# Patient Record
Sex: FEMALE | Race: WHITE | NOT HISPANIC OR LATINO | Employment: FULL TIME | URBAN - METROPOLITAN AREA
[De-identification: names, ages, dates, MRNs, and addresses within clinical notes are randomized per-mention and may not be internally consistent; named-entity substitution may affect disease eponyms.]

---
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
## 2020-09-25 ENCOUNTER — OFFICE VISIT (OUTPATIENT)
Dept: URGENT CARE | Facility: CLINIC | Age: 20
End: 2020-09-25
Payer: COMMERCIAL

## 2020-09-25 VITALS
SYSTOLIC BLOOD PRESSURE: 104 MMHG | RESPIRATION RATE: 18 BRPM | HEIGHT: 63 IN | TEMPERATURE: 98.7 F | OXYGEN SATURATION: 99 % | WEIGHT: 164 LBS | BODY MASS INDEX: 29.06 KG/M2 | DIASTOLIC BLOOD PRESSURE: 62 MMHG | HEART RATE: 79 BPM

## 2020-09-25 DIAGNOSIS — R07.81 RIB PAIN ON RIGHT SIDE: Primary | ICD-10-CM

## 2020-09-25 PROCEDURE — G0382 LEV 3 HOSP TYPE B ED VISIT: HCPCS | Performed by: PHYSICIAN ASSISTANT

## 2020-09-25 NOTE — PROGRESS NOTES
NAME: Azalea Andrade is a 21 y o  female  : 2000    MRN: [id number]      Assessment and Plan   Rib pain on right side [R07 81]  1  Rib pain on right side         Patient reports resolution of pain  Unable to reproduce on exam   No concerning associated symptoms  Discussed at the pain comes back to follow back up  Likely a muscle strain like patient stated as the area she points to is at anterior ribs 6 through 8     Anything changes or worsens go to the ER  She acknowledges    Patient Instructions   Patient Instructions   If pain returns follow back up  Anything changes or worsens go to the ER    Proceed to ER if symptoms worsen  Chief Complaint     Chief Complaint   Patient presents with    Abdominal Pain     right side pain at liver area 2 days  But not there now  History of Present Illness   Patient with no reported past medical history presents complaining of right-sided rib/abdominal pain x2 days  States 2 days ago she had the gradual onset of right-sided rib/abdominal pain  Not sure which one it is  States she thinks she might have pulled a muscle as she does a lot of heavy lifting but unsure  She reports that the pain has fully resolved as of earlier today  Currently denies any pain  Denies any associated symptoms such as nausea, vomiting, diarrhea, constipation, dysuria, hematuria, frequency, back pain, blood in stool or mucus in stool, chest pain, palpitations, shortness of breath or dyspnea  No pain on inspiration  She has not taken anything over-the-counter for her symptoms  States that when the pain was there it was pretty constant denies any radiation  States certain movements make it worse  Review of Systems   Review of Systems   Constitutional: Negative for chills, fatigue and fever  HENT: Negative for congestion  Respiratory: Negative for cough, chest tightness, shortness of breath, wheezing and stridor      Cardiovascular: Negative for chest pain and palpitations  Gastrointestinal: Negative for anal bleeding, blood in stool, constipation, diarrhea, nausea and vomiting  Right upper quadrant/right rib pain   Genitourinary: Negative for dysuria, flank pain, frequency, hematuria and urgency  Musculoskeletal: Negative for back pain, neck pain and neck stiffness  Skin: Negative for rash  Neurological: Negative for dizziness, light-headedness and headaches  Current Medications       Current Outpatient Medications:     LORazepam (ATIVAN) 0 5 mg tablet, Take 0 25 mg by mouth 2 (two) times a day, Disp: , Rfl:     Current Allergies     Allergies as of 09/25/2020 - Reviewed 09/25/2020   Allergen Reaction Noted    Buspar [buspirone]  01/18/2017    Clonazepam  01/18/2017              Past Medical History:   Diagnosis Date    Anxiety     Asthma     Depression     Psychiatric disorder     Self-injurious behavior        No past surgical history on file  No family history on file  Medications have been verified  The following portions of the patient's history were reviewed and updated as appropriate: allergies, current medications, past family history, past medical history, past social history, past surgical history and problem list     Objective   /62   Pulse 79   Temp 98 7 °F (37 1 °C)   Resp 18   Ht 5' 3" (1 6 m)   Wt 74 4 kg (164 lb)   SpO2 99%   BMI 29 05 kg/m²      Physical Exam     Physical Exam  Vitals signs and nursing note reviewed  Constitutional:       General: She is not in acute distress  Appearance: She is well-developed  She is not ill-appearing, toxic-appearing or diaphoretic  Cardiovascular:      Rate and Rhythm: Normal rate and regular rhythm  Heart sounds: Normal heart sounds  No murmur  Pulmonary:      Effort: Pulmonary effort is normal  No respiratory distress  Breath sounds: Normal breath sounds  No stridor  No wheezing, rhonchi or rales  Abdominal:      General: Abdomen is flat  Bowel sounds are normal  There is no distension  Tenderness: There is no abdominal tenderness  There is no right CVA tenderness, left CVA tenderness, guarding or rebound  Negative signs include Flores's sign, Rovsing's sign and McBurney's sign  Musculoskeletal:      Comments: Right ribs:  No deformities or step-offs  No tenderness to palpation anywhere in the ribs  Neurological:      Mental Status: She is alert

## 2022-05-08 ENCOUNTER — HOSPITAL ENCOUNTER (OUTPATIENT)
Facility: HOSPITAL | Age: 22
Setting detail: OBSERVATION
Discharge: HOME | End: 2022-05-09
Attending: EMERGENCY MEDICINE | Admitting: OBSTETRICS & GYNECOLOGY
Payer: COMMERCIAL

## 2022-05-08 DIAGNOSIS — N83.209 RUPTURED OVARIAN CYST: Primary | ICD-10-CM

## 2022-05-08 DIAGNOSIS — D64.9 ANEMIA: ICD-10-CM

## 2022-05-08 PROBLEM — D50.0 ANEMIA DUE TO BLOOD LOSS: Status: ACTIVE | Noted: 2022-05-08

## 2022-05-08 LAB
ABO GROUP BLD: NORMAL
ABO GROUP BLD: NORMAL
ALBUMIN SERPL BCP-MCNC: 3.7 G/DL (ref 3.5–5)
ALP SERPL-CCNC: 66 U/L (ref 46–116)
ALT SERPL W P-5'-P-CCNC: 12 U/L (ref 12–78)
ANION GAP SERPL CALCULATED.3IONS-SCNC: 8 MMOL/L (ref 4–13)
APTT PPP: 42 SECONDS (ref 23–37)
AST SERPL W P-5'-P-CCNC: 8 U/L (ref 5–45)
B-HCG SERPL-ACNC: <2 MIU/ML
BASOPHILS # BLD AUTO: 0.02 THOUSANDS/ΜL (ref 0–0.1)
BASOPHILS NFR BLD AUTO: 0 % (ref 0–1)
BILIRUB SERPL-MCNC: 0.4 MG/DL (ref 0.2–1)
BILIRUB UR QL STRIP: NEGATIVE
BLD GP AB SCN SERPL QL: NEGATIVE
BUN SERPL-MCNC: 8 MG/DL (ref 5–25)
CALCIUM SERPL-MCNC: 8.2 MG/DL (ref 8.3–10.1)
CHLORIDE SERPL-SCNC: 103 MMOL/L (ref 100–108)
CLARITY UR: CLEAR
CO2 SERPL-SCNC: 27 MMOL/L (ref 21–32)
COLOR UR: YELLOW
CREAT SERPL-MCNC: 0.78 MG/DL (ref 0.6–1.3)
EOSINOPHIL # BLD AUTO: 0.01 THOUSAND/ΜL (ref 0–0.61)
EOSINOPHIL NFR BLD AUTO: 0 % (ref 0–6)
ERYTHROCYTE [DISTWIDTH] IN BLOOD BY AUTOMATED COUNT: 12.7 % (ref 11.6–15.1)
EXT PREG TEST URINE: NEGATIVE
EXT. CONTROL ED NAV: NORMAL
GFR SERPL CREATININE-BSD FRML MDRD: 109 ML/MIN/1.73SQ M
GLUCOSE SERPL-MCNC: 84 MG/DL (ref 65–140)
GLUCOSE UR STRIP-MCNC: NEGATIVE MG/DL
HCT VFR BLD AUTO: 30.2 % (ref 34.8–46.1)
HCT VFR BLD AUTO: 30.6 % (ref 34.8–46.1)
HCT VFR BLD AUTO: 33.5 % (ref 34.8–46.1)
HGB BLD-MCNC: 10.1 G/DL (ref 11.5–15.4)
HGB BLD-MCNC: 10.7 G/DL (ref 11.5–15.4)
HGB BLD-MCNC: 9.7 G/DL (ref 11.5–15.4)
HGB UR QL STRIP.AUTO: NEGATIVE
IMM GRANULOCYTES # BLD AUTO: 0.01 THOUSAND/UL (ref 0–0.2)
IMM GRANULOCYTES NFR BLD AUTO: 0 % (ref 0–2)
INR PPP: 1.15 (ref 0.84–1.19)
KETONES UR STRIP-MCNC: NEGATIVE MG/DL
LEUKOCYTE ESTERASE UR QL STRIP: NEGATIVE
LYMPHOCYTES # BLD AUTO: 0.93 THOUSANDS/ΜL (ref 0.6–4.47)
LYMPHOCYTES NFR BLD AUTO: 20 % (ref 14–44)
MCH RBC QN AUTO: 29.6 PG (ref 26.8–34.3)
MCH RBC QN AUTO: 30 PG (ref 26.8–34.3)
MCH RBC QN AUTO: 30.4 PG (ref 26.8–34.3)
MCHC RBC AUTO-ENTMCNC: 31.9 G/DL (ref 31.4–37.4)
MCHC RBC AUTO-ENTMCNC: 32.1 G/DL (ref 31.4–37.4)
MCHC RBC AUTO-ENTMCNC: 33 G/DL (ref 31.4–37.4)
MCV RBC AUTO: 92 FL (ref 82–98)
MCV RBC AUTO: 93 FL (ref 82–98)
MCV RBC AUTO: 94 FL (ref 82–98)
MONOCYTES # BLD AUTO: 0.35 THOUSAND/ΜL (ref 0.17–1.22)
MONOCYTES NFR BLD AUTO: 7 % (ref 4–12)
NEUTROPHILS # BLD AUTO: 3.43 THOUSANDS/ΜL (ref 1.85–7.62)
NEUTS SEG NFR BLD AUTO: 73 % (ref 43–75)
NITRITE UR QL STRIP: NEGATIVE
NRBC BLD AUTO-RTO: 0 /100 WBCS
PH UR STRIP.AUTO: 6 [PH]
PLATELET # BLD AUTO: 166 THOUSANDS/UL (ref 149–390)
PLATELET # BLD AUTO: 172 THOUSANDS/UL (ref 149–390)
PLATELET # BLD AUTO: 187 THOUSANDS/UL (ref 149–390)
PMV BLD AUTO: 11.2 FL (ref 8.9–12.7)
PMV BLD AUTO: 11.3 FL (ref 8.9–12.7)
PMV BLD AUTO: 11.5 FL (ref 8.9–12.7)
POTASSIUM SERPL-SCNC: 3.5 MMOL/L (ref 3.5–5.3)
PROT SERPL-MCNC: 7.5 G/DL (ref 6.4–8.2)
PROT UR STRIP-MCNC: NEGATIVE MG/DL
PROTHROMBIN TIME: 14.6 SECONDS (ref 11.6–14.5)
RBC # BLD AUTO: 3.23 MILLION/UL (ref 3.81–5.12)
RBC # BLD AUTO: 3.32 MILLION/UL (ref 3.81–5.12)
RBC # BLD AUTO: 3.61 MILLION/UL (ref 3.81–5.12)
RH BLD: POSITIVE
RH BLD: POSITIVE
SODIUM SERPL-SCNC: 138 MMOL/L (ref 136–145)
SP GR UR STRIP.AUTO: 1.02 (ref 1–1.03)
SPECIMEN EXPIRATION DATE: NORMAL
UROBILINOGEN UR QL STRIP.AUTO: 0.2 E.U./DL
WBC # BLD AUTO: 4.42 THOUSAND/UL (ref 4.31–10.16)
WBC # BLD AUTO: 4.48 THOUSAND/UL (ref 4.31–10.16)
WBC # BLD AUTO: 4.75 THOUSAND/UL (ref 4.31–10.16)

## 2022-05-08 PROCEDURE — 86850 RBC ANTIBODY SCREEN: CPT | Performed by: PHYSICIAN ASSISTANT

## 2022-05-08 PROCEDURE — 81003 URINALYSIS AUTO W/O SCOPE: CPT | Performed by: PHYSICIAN ASSISTANT

## 2022-05-08 PROCEDURE — 85027 COMPLETE CBC AUTOMATED: CPT | Performed by: OBSTETRICS & GYNECOLOGY

## 2022-05-08 PROCEDURE — 96374 THER/PROPH/DIAG INJ IV PUSH: CPT

## 2022-05-08 PROCEDURE — 99285 EMERGENCY DEPT VISIT HI MDM: CPT

## 2022-05-08 PROCEDURE — 81025 URINE PREGNANCY TEST: CPT | Performed by: PHYSICIAN ASSISTANT

## 2022-05-08 PROCEDURE — 86900 BLOOD TYPING SEROLOGIC ABO: CPT | Performed by: PHYSICIAN ASSISTANT

## 2022-05-08 PROCEDURE — 96361 HYDRATE IV INFUSION ADD-ON: CPT

## 2022-05-08 PROCEDURE — 36415 COLL VENOUS BLD VENIPUNCTURE: CPT | Performed by: PHYSICIAN ASSISTANT

## 2022-05-08 PROCEDURE — 85025 COMPLETE CBC W/AUTO DIFF WBC: CPT | Performed by: PHYSICIAN ASSISTANT

## 2022-05-08 PROCEDURE — 99284 EMERGENCY DEPT VISIT MOD MDM: CPT | Performed by: PHYSICIAN ASSISTANT

## 2022-05-08 PROCEDURE — NC001 PR NO CHARGE: Performed by: OBSTETRICS & GYNECOLOGY

## 2022-05-08 PROCEDURE — 86901 BLOOD TYPING SEROLOGIC RH(D): CPT | Performed by: PHYSICIAN ASSISTANT

## 2022-05-08 PROCEDURE — 84702 CHORIONIC GONADOTROPIN TEST: CPT | Performed by: OBSTETRICS & GYNECOLOGY

## 2022-05-08 PROCEDURE — 80053 COMPREHEN METABOLIC PANEL: CPT | Performed by: PHYSICIAN ASSISTANT

## 2022-05-08 PROCEDURE — 85610 PROTHROMBIN TIME: CPT | Performed by: PHYSICIAN ASSISTANT

## 2022-05-08 PROCEDURE — 85730 THROMBOPLASTIN TIME PARTIAL: CPT | Performed by: PHYSICIAN ASSISTANT

## 2022-05-08 RX ORDER — KETOROLAC TROMETHAMINE 30 MG/ML
30 INJECTION, SOLUTION INTRAMUSCULAR; INTRAVENOUS ONCE
Status: COMPLETED | OUTPATIENT
Start: 2022-05-08 | End: 2022-05-08

## 2022-05-08 RX ORDER — CITALOPRAM 20 MG/1
20 TABLET ORAL DAILY
COMMUNITY
Start: 2022-04-29

## 2022-05-08 RX ORDER — KETOROLAC TROMETHAMINE 30 MG/ML
30 INJECTION, SOLUTION INTRAMUSCULAR; INTRAVENOUS EVERY 6 HOURS PRN
Status: DISCONTINUED | OUTPATIENT
Start: 2022-05-08 | End: 2022-05-09 | Stop reason: HOSPADM

## 2022-05-08 RX ORDER — SODIUM CHLORIDE, SODIUM LACTATE, POTASSIUM CHLORIDE, CALCIUM CHLORIDE 600; 310; 30; 20 MG/100ML; MG/100ML; MG/100ML; MG/100ML
125 INJECTION, SOLUTION INTRAVENOUS CONTINUOUS
Status: DISCONTINUED | OUTPATIENT
Start: 2022-05-08 | End: 2022-05-09

## 2022-05-08 RX ADMIN — KETOROLAC TROMETHAMINE 30 MG: 30 INJECTION, SOLUTION INTRAMUSCULAR; INTRAVENOUS at 20:55

## 2022-05-08 RX ADMIN — KETOROLAC TROMETHAMINE 30 MG: 30 INJECTION, SOLUTION INTRAMUSCULAR at 14:49

## 2022-05-08 RX ADMIN — SODIUM CHLORIDE, SODIUM LACTATE, POTASSIUM CHLORIDE, AND CALCIUM CHLORIDE 125 ML/HR: .6; .31; .03; .02 INJECTION, SOLUTION INTRAVENOUS at 17:06

## 2022-05-08 RX ADMIN — SODIUM CHLORIDE 1000 ML: 0.9 INJECTION, SOLUTION INTRAVENOUS at 14:42

## 2022-05-08 NOTE — LETTER
[facility]G [facility]  [address] [id number]  Dept: [phone]    May 9, 2022     Patient: Helen Manzanares   YOB: 2000   Date of Visit: 5/8/2022       To Whom it May Concern:    Helen Manzanares is under my professional care  She was seen in the hospital from 5/8/2022   to 05/09/22  She may return to work on May 17, 2022 without limitations  If you have any questions or concerns, please don't hesitate to call           Sincerely,          Jazmin Sol, DO

## 2022-05-08 NOTE — H&P
History and Physical  DOS: 5/8/2022                                                              Location:   ED7    Ana Robledo is a 24 y o  G0 who presents to ED with abdominal pain  3 days ago, she had severe pelvic and abd pain that started after intercourse, followed by syncopal episode  She presented at that time to [facility] ED where she had another syncopal episode and vomiting  She had CT and u/s which revealed ad and pelvic free fluid, most consistent with ruptured hemorrheagic ovarian cyst  She had neg pregnancy test  Hgb was 13 at that time  She was discharged  Since that time, pain is a little better, but still present and 5/10, worse with movement  She has been taking motrin/tylenol for pain  No nausea/ vomiting/ constipaiton or diarrhea  She does report decreased appetite LMP was 4/9, she reports she has regular menses and is due to get it tomorrow  Past Medical History:   Diagnosis Date    Anxiety     Asthma     Depression     Psychiatric disorder     Self-injurious behavior      History reviewed  No pertinent surgical history  Past OB/Gyn History:  Menstrual cycles every month  She has never seen an OBGYN  She is sexually active  She is not using contraception  Denies any history of sexually transmitted infection  Mild dysmenorrhea  History reviewed  No pertinent family history    Social History:  Social History     Socioeconomic History    Marital status: Single     Spouse name: Not on file    Number of children: Not on file    Years of education: Not on file    Highest education level: Not on file   Occupational History    Not on file   Tobacco Use    Smoking status: Current Every Day Smoker     Packs/day: 0 75     Types: Cigarettes    Smokeless tobacco: Never Used    Tobacco comment: "down to 2 cigs a day"   Vaping Use    Vaping Use: Not on file   Substance and Sexual Activity    Alcohol use: No    Drug use: No     Comment: states occassional "once a month" marijuana use    Sexual activity: Not on file   Other Topics Concern    Not on file   Social History Narrative    Not on file     Social Determinants of Health     Financial Resource Strain: Not on file   Food Insecurity: Not on file   Transportation Needs: Not on file   Physical Activity: Not on file   Stress: Not on file   Social Connections: Not on file   Intimate Partner Violence: Not on file   Housing Stability: Not on file     Allergies   Allergen Reactions    Buspar [Buspirone]     Clonazepam      No current facility-administered medications for this encounter  Current Outpatient Medications:     citalopram (CeleXA) 20 mg tablet, Take 20 mg by mouth daily, Disp: , Rfl:     LORazepam (ATIVAN) 0 5 mg tablet, Take 0 25 mg by mouth 2 (two) times a day (Patient not taking: Reported on 5/8/2022 ), Disp: , Rfl:     Review of Systems:  A complete review of systems was performed and was negative, except as listed  Ultrasound pelvis  Complex free fluid in the pelvis and particularly around the right ovary  Consider possible hemorrhage from ruptured ovarian cyst  CT abdomen pelvis with contrast  Free fluid in the upper abdomen  More complex mixed density fluid noted in the pelvis  Consider possible hemorrhage related to ruptured ovarian cyst        Physical Exam:  /56 (BP Location: Right arm)   Pulse 82   Resp 18   Ht 5' 3" (1 6 m)   Wt 74 4 kg (164 lb)   LMP 04/24/2022   SpO2 99%   BMI 29 05 kg/m²   GEN: The patient was alert and oriented x3, pleasant well-appearing female in no acute distress  ABD:  Soft, mildly tender to palpation in all four quadrants, nondistended, no rebound or guarding     EXT:  WWP, nontender, no edema    Lab Results   Component Value Date    WBC 4 75 05/08/2022    HGB 10 7 (L) 05/08/2022    HCT 33 5 (L) 05/08/2022    MCV 93 05/08/2022     05/08/2022     Lab Results   Component Value Date    SODIUM 138 05/08/2022    K 3 5 05/08/2022     05/08/2022    CO2 27 05/08/2022    AGAP 8 05/08/2022    BUN 8 05/08/2022    CREATININE 0 78 05/08/2022    GLUC 84 05/08/2022    CALCIUM 8 2 (L) 05/08/2022    AST 8 05/08/2022    ALT 12 05/08/2022    ALKPHOS 66 05/08/2022    TP 7 5 05/08/2022    TBILI 0 40 05/08/2022    EGFR 109 05/08/2022         Assessment & Plan: Rupesh Berrios is a 24 y o  G0 with ruptured hemorrhagic  ovarian cyst and drop in hgb from 13 to 10  Admit for obs  Serial labs, exams, may consider repeat imaging if worsening pain  Discussed possible need for surgery if concern for ongoing bleeding

## 2022-05-08 NOTE — ED PROVIDER NOTES
History  Chief Complaint   Patient presents with    Abdominal Pain     pt started with abd pain thursday and went to [place] ER had ultrasound perform and was told she had an ovarian cyst rupture, pt dc home  pt here for increase abd pain, lower back pain and dizziness     Patient is a 24 year white female with history of anxiety, asthma, depression who reports lower abdominal cramping beginning 3 days ago soon after having sexual intercourse with her boyfriend  She states she was then walking out of her residence when she had syncopal event in the hallway  Boyfriend brought her to [facility] ER where she had syncopal event in the waiting room  She was diagnosed based on CT scan and pelvic ultrasound with a suspected ruptured ovarian cyst,  likely hemorrhagic  She was discharged home with advice to take Tylenol or Motrin  She returns to this ER today stating continued abdominal pain and lower back pain  She has been trying to manage the pain with Tylenol and Motrin but does not feel she is improving  She denies fever or chills  She denies nausea or vomiting  Her last period was "the middle of last month"  She does not use birth control  She reports decreased appetite  She reports headache beginning 2 days ago           Prior to Admission Medications   Prescriptions Last Dose Informant Patient Reported? Taking? LORazepam (ATIVAN) 0 5 mg tablet Not Taking at Unknown time  Yes No   Sig: Take 0 25 mg by mouth 2 (two) times a day   Patient not taking: Reported on 5/8/2022    citalopram (CeleXA) 20 mg tablet 5/8/2022 at Unknown time  Yes Yes   Sig: Take 20 mg by mouth daily      Facility-Administered Medications: None       Past Medical History:   Diagnosis Date    Anxiety     Asthma     Depression     Psychiatric disorder     Self-injurious behavior        History reviewed  No pertinent surgical history  History reviewed  No pertinent family history    I have reviewed and agree with the history as documented  E-Cigarette/Vaping     E-Cigarette/Vaping Substances    Nicotine No     THC No     CBD No     Flavoring No     Other No     Unknown No      Social History     Tobacco Use    Smoking status: Current Every Day Smoker     Packs/day: 0 75     Types: Cigarettes    Smokeless tobacco: Never Used    Tobacco comment: "down to 2 cigs a day"   Vaping Use    Vaping Use: Not on file   Substance Use Topics    Alcohol use: No    Drug use: No     Comment: states occassional "once a month" marijuana use       Review of Systems   Constitutional: Negative for chills and fever  Respiratory: Negative for cough and shortness of breath  Cardiovascular: Negative for chest pain and palpitations  Gastrointestinal: Positive for abdominal pain  Negative for diarrhea, nausea and vomiting  Genitourinary: Negative for dysuria, hematuria, vaginal bleeding and vaginal discharge  Musculoskeletal: Positive for back pain  Negative for arthralgias and neck pain  Skin: Negative for rash  Neurological: Positive for syncope and headaches  All other systems reviewed and are negative  Physical Exam  Physical Exam  Vitals and nursing note reviewed  Constitutional:       General: She is not in acute distress  Appearance: She is well-developed  She is not ill-appearing, toxic-appearing or diaphoretic  HENT:      Head: Normocephalic and atraumatic  Mouth/Throat:      Mouth: Mucous membranes are moist       Pharynx: Oropharynx is clear  Eyes:      Extraocular Movements: Extraocular movements intact  Pupils: Pupils are equal, round, and reactive to light  Cardiovascular:      Rate and Rhythm: Normal rate and regular rhythm  Heart sounds: Normal heart sounds  Pulmonary:      Effort: Pulmonary effort is normal       Breath sounds: Normal breath sounds  Abdominal:      General: Abdomen is flat  Bowel sounds are normal       Palpations: Abdomen is soft  Tenderness:  There is no right CVA tenderness, left CVA tenderness, guarding or rebound  Comments: Diffuse abd tenderness, worse in lower abdomen R>L   Skin:     General: Skin is warm and dry  Capillary Refill: Capillary refill takes less than 2 seconds  Neurological:      Mental Status: She is alert           Vital Signs  ED Triage Vitals   Temp Pulse Respirations Blood Pressure SpO2   -- 05/08/22 1350 05/08/22 1350 05/08/22 1350 05/08/22 1350    80 18 126/60 98 %      Temp src Heart Rate Source Patient Position - Orthostatic VS BP Location FiO2 (%)   -- -- 05/08/22 1424 05/08/22 1424 --     Lying Right arm       Pain Score       05/08/22 1419       6           Vitals:    05/08/22 1350 05/08/22 1424   BP: 126/60 114/56   Pulse: 80 82   Patient Position - Orthostatic VS:  Lying         Visual Acuity      ED Medications  Medications   sodium chloride 0 9 % bolus 1,000 mL (1,000 mL Intravenous New Bag 5/8/22 1442)   ketorolac (TORADOL) injection 30 mg (30 mg Intravenous Given 5/8/22 1449)       Diagnostic Studies  Results Reviewed     Procedure Component Value Units Date/Time    Protime-INR [[id number]]     Lab Status: No result Specimen: Blood     APTT [[id number]]     Lab Status: No result Specimen: Blood     Comprehensive metabolic panel [[id number]]  (Abnormal) Collected: 05/08/22 1439    Lab Status: Final result Specimen: Blood from Arm, Left Updated: 05/08/22 1512     Sodium 138 mmol/L      Potassium 3 5 mmol/L      Chloride 103 mmol/L      CO2 27 mmol/L      ANION GAP 8 mmol/L      BUN 8 mg/dL      Creatinine 0 78 mg/dL      Glucose 84 mg/dL      Calcium 8 2 mg/dL      AST 8 U/L      ALT 12 U/L      Alkaline Phosphatase 66 U/L      Total Protein 7 5 g/dL      Albumin 3 7 g/dL      Total Bilirubin 0 40 mg/dL      eGFR 109 ml/min/1 73sq m     Narrative:      [place] guidelines for Chronic Kidney Disease (CKD):     Stage 1 with normal or high GFR (GFR > 90 mL/min/1 73 square meters)    Stage 2 Mild CKD (GFR = 60-89 mL/min/1 73 square meters)    Stage 3A Moderate CKD (GFR = 45-59 mL/min/1 73 square meters)    Stage 3B Moderate CKD (GFR = 30-44 mL/min/1 73 square meters)    Stage 4 Severe CKD (GFR = 15-29 mL/min/1 73 square meters)    Stage 5 End Stage CKD (GFR <15 mL/min/1 73 square meters)  Note: GFR calculation is accurate only with a steady state creatinine    UA w Reflex to Microscopic w Reflex to Culture [[id number]] Collected: 05/08/22 1449    Lab Status: Final result Specimen: Urine, Clean Catch Updated: 05/08/22 1511     Color, UA Yellow     Clarity, UA Clear     Specific [place], UA 1 020     pH, UA 6 0     Leukocytes, UA Negative     Nitrite, UA Negative     Protein, UA Negative mg/dl      Glucose, UA Negative mg/dl      Ketones, UA Negative mg/dl      Urobilinogen, UA 0 2 E U /dl      Bilirubin, UA Negative     Blood, UA Negative    POCT pregnancy, urine [[id number]]  (Normal) Resulted: 05/08/22 1448    Lab Status: Final result Updated: 05/08/22 1448     EXT PREG TEST UR (Ref: Negative) Negative     Control Valid    CBC and differential [[id number]]  (Abnormal) Collected: 05/08/22 1439    Lab Status: Final result Specimen: Blood from Arm, Left Updated: 05/08/22 1445     WBC 4 75 Thousand/uL      RBC 3 61 Million/uL      Hemoglobin 10 7 g/dL      Hematocrit 33 5 %      MCV 93 fL      MCH 29 6 pg      MCHC 31 9 g/dL      RDW 12 7 %      MPV 11 2 fL      Platelets 899 Thousands/uL      nRBC 0 /100 WBCs      Neutrophils Relative 73 %      Immat GRANS % 0 %      Lymphocytes Relative 20 %      Monocytes Relative 7 %      Eosinophils Relative 0 %      Basophils Relative 0 %      Neutrophils Absolute 3 43 Thousands/µL      Immature Grans Absolute 0 01 Thousand/uL      Lymphocytes Absolute 0 93 Thousands/µL      Monocytes Absolute 0 35 Thousand/µL      Eosinophils Absolute 0 01 Thousand/µL      Basophils Absolute 0 02 Thousands/µL                  No orders to display              Procedures  Procedures         ED Course  ED Course as of 05/08/22 1528   Sun May 08, 2022   1504 Hgb: 10 7 today  Was 13 3 at [place] 3 days ago  [place] text to obgyn Dr Demetria Severance  1521 Given hemoglobin dropped from 13 3 days ago to 10 7 today I discussed with OBGYN Dr Demetria Severance  my concern and will admit patient to observation for serial exams and serial blood work                                             MDM  Number of Diagnoses or Management Options  Anemia: new and requires workup  Ruptured ovarian cyst: new and requires workup  Diagnosis management comments: 49-year-old white female with onset 3 days of lower abdominal cramping after sexual intercourse  Diagnosed with likely ruptured hemorrhagic ovarian cyst at [place] ED  Hemoglobin dropped from 13 3- to 10 7 today  Discussed with OBGYN and will admit for observation and serial exams and  blood work         Amount and/or Complexity of Data Reviewed  Clinical lab tests: reviewed  Tests in the medicine section of CPT®: reviewed  Decide to obtain previous medical records or to obtain history from someone other than the patient: yes  Review and summarize past medical records: yes  Discuss the patient with other providers: yes  Independent visualization of images, tracings, or specimens: yes    Risk of Complications, Morbidity, and/or Mortality  Presenting problems: high  Diagnostic procedures: low  Management options: moderate    Patient Progress  Patient progress: stable      Disposition  Final diagnoses:   Ruptured ovarian cyst   Anemia     Time reflects when diagnosis was documented in both MDM as applicable and the Disposition within this note     Time User Action Codes Description Comment    5/8/2022  3:16 PM Juani Mc Add [F10 [id number]] Ruptured ovarian cyst     5/8/2022  3:16 PM Juani Mc Add [D64 9] Anemia       ED Disposition     ED Disposition Condition Date/Time Comment    Admit Stable [place] May 8, 2022  3:16 PM Case was discussed with Dr Demetria Severance and the patient's admission status was agreed to be Admission Status: observation status to the service of Dr Kev Persuad   Follow-up Information    None         Patient's Medications   Discharge Prescriptions    No medications on file       No discharge procedures on file      PDMP Review     None          ED Provider  Electronically Signed by           Maria G Gaines PA-C  05/08/22 6683

## 2022-05-08 NOTE — PLAN OF CARE
Problem: PAIN - ADULT  Goal: Verbalizes/displays adequate comfort level or baseline comfort level  Description: Interventions:  - Encourage patient to monitor pain and request assistance  - Assess pain using appropriate pain scale  - Administer analgesics based on type and severity of pain and evaluate response  - Implement non-pharmacological measures as appropriate and evaluate response  - Consider cultural and social influences on pain and pain management  - Notify physician/advanced practitioner if interventions unsuccessful or patient reports new pain  Outcome: Progressing     Problem: INFECTION - ADULT  Goal: Absence or prevention of progression during hospitalization  Description: INTERVENTIONS:  - Assess and monitor for signs and symptoms of infection  - Monitor lab/diagnostic results  - Monitor all insertion sites, i e  indwelling lines, tubes, and drains  - Monitor endotracheal if appropriate and nasal secretions for changes in amount and color  - Mallory appropriate cooling/warming therapies per order  - Administer medications as ordered  - Instruct and encourage patient and family to use good hand hygiene technique  - Identify and instruct in appropriate isolation precautions for identified infection/condition  Outcome: Progressing     Problem: DISCHARGE PLANNING  Goal: Discharge to home or other facility with appropriate resources  Description: INTERVENTIONS:  - Identify barriers to discharge w/patient and caregiver  - Arrange for needed discharge resources and transportation as appropriate  - Identify discharge learning needs (meds, wound care, etc )  - Arrange for interpretive services to assist at discharge as needed  - Refer to Case Management Department for coordinating discharge planning if the patient needs post-hospital services based on physician/advanced practitioner order or complex needs related to functional status, cognitive ability, or social support system  Outcome: Progressing Problem: Knowledge Deficit  Goal: Patient/family/caregiver demonstrates understanding of disease process, treatment plan, medications, and discharge instructions  Description: Complete learning assessment and assess knowledge base    Interventions:  - Provide teaching at level of understanding  - Provide teaching via preferred learning methods  Outcome: Progressing

## 2022-05-09 VITALS
RESPIRATION RATE: 18 BRPM | BODY MASS INDEX: 29.06 KG/M2 | DIASTOLIC BLOOD PRESSURE: 66 MMHG | OXYGEN SATURATION: 99 % | WEIGHT: 164 LBS | HEIGHT: 63 IN | TEMPERATURE: 98.7 F | HEART RATE: 76 BPM | SYSTOLIC BLOOD PRESSURE: 109 MMHG

## 2022-05-09 LAB
ERYTHROCYTE [DISTWIDTH] IN BLOOD BY AUTOMATED COUNT: 12.9 % (ref 11.6–15.1)
ERYTHROCYTE [DISTWIDTH] IN BLOOD BY AUTOMATED COUNT: 12.9 % (ref 11.6–15.1)
HCT VFR BLD AUTO: 28.3 % (ref 34.8–46.1)
HCT VFR BLD AUTO: 29.9 % (ref 34.8–46.1)
HGB BLD-MCNC: 9.2 G/DL (ref 11.5–15.4)
HGB BLD-MCNC: 9.5 G/DL (ref 11.5–15.4)
MCH RBC QN AUTO: 29.6 PG (ref 26.8–34.3)
MCH RBC QN AUTO: 30.2 PG (ref 26.8–34.3)
MCHC RBC AUTO-ENTMCNC: 31.8 G/DL (ref 31.4–37.4)
MCHC RBC AUTO-ENTMCNC: 32.5 G/DL (ref 31.4–37.4)
MCV RBC AUTO: 93 FL (ref 82–98)
MCV RBC AUTO: 93 FL (ref 82–98)
PLATELET # BLD AUTO: 161 THOUSANDS/UL (ref 149–390)
PLATELET # BLD AUTO: 169 THOUSANDS/UL (ref 149–390)
PMV BLD AUTO: 11.3 FL (ref 8.9–12.7)
PMV BLD AUTO: 11.3 FL (ref 8.9–12.7)
RBC # BLD AUTO: 3.05 MILLION/UL (ref 3.81–5.12)
RBC # BLD AUTO: 3.21 MILLION/UL (ref 3.81–5.12)
WBC # BLD AUTO: 3.63 THOUSAND/UL (ref 4.31–10.16)
WBC # BLD AUTO: 4.84 THOUSAND/UL (ref 4.31–10.16)

## 2022-05-09 PROCEDURE — 85027 COMPLETE CBC AUTOMATED: CPT | Performed by: OBSTETRICS & GYNECOLOGY

## 2022-05-09 PROCEDURE — NC001 PR NO CHARGE: Performed by: OBSTETRICS & GYNECOLOGY

## 2022-05-09 RX ORDER — CITALOPRAM 20 MG/1
20 TABLET ORAL DAILY
Status: DISCONTINUED | OUTPATIENT
Start: 2022-05-09 | End: 2022-05-09 | Stop reason: HOSPADM

## 2022-05-09 RX ORDER — IBUPROFEN 200 MG
600 TABLET ORAL EVERY 6 HOURS PRN
Qty: 20 TABLET | Refills: 0 | Status: SHIPPED | OUTPATIENT
Start: 2022-05-09 | End: 2022-05-14

## 2022-05-09 RX ADMIN — CITALOPRAM HYDROBROMIDE 20 MG: 20 TABLET ORAL at 11:19

## 2022-05-09 NOTE — UTILIZATION REVIEW
Initial Clinical Review    Admission: Date/Time/Statement: 5/8/22 1517 Observation   Admission Orders (From admission, onward)     Ordered        05/08/22 1517  Place in Observation  Once                      Orders Placed This Encounter   Procedures    Place in Observation     Standing Status:   Standing     Number of Occurrences:   1     Order Specific Question:   Level of Care     Answer:   Med Surg [16]     ED Arrival Information     Expected Arrival Acuity    - 5/8/2022 12:35 Urgent         Means of arrival Escorted by Service Admission type    [facility] Member OB/GYN Urgent         Arrival complaint    abdominal and low back pain        Chief Complaint   Patient presents with    Abdominal Pain     pt started with abd pain thursday and went to [facility] had ultrasound perform and was told she had an ovarian cyst rupture, pt dc home  pt here for increase abd pain, lower back pain and dizziness       Initial Presentation: 24 y o  female from home to ED admitted to observation due to ruptured hemorrhagic ovarian cyst with acute blood loss anemia  Presented due to lower abdominal cramping starting about 3 days prior to arrival after having intercourse, seen at another ED after 2 syncopal events 5/5/22 and told had suspected ruptured ovarian cyst   Pain is not improved, appetite decreased and +  headache  CT and u/s done 5/5/22 showed ad and pelvic free fluid, most consistent with ruptured hemorrheagic ovarian cyst On exam diffuse abdominal tenderness, worse in lower abdomen R>L  H&H 10 7/33 5  With hgb of 13 3 on 5/5/22  In the ED given 1 liter IVF bolus, Toradol  Plan is trend H&H, pain control, IVF in progress   Possible surgical intervention if ongoing bleeding  5/9/22 Observation:  Patient has continued abdominal pain  Hungry, tolerated breakfast  On exam abdomen soft  H&H 9 2/28  3  Plan is dc IVF then check CBC  Make NPO in case needs surgical intervention         ED Triage Vitals Temperature Pulse Respirations Blood Pressure SpO2   05/08/22 1622 05/08/22 1350 05/08/22 1350 05/08/22 1350 05/08/22 1350   98 7 °F (37 1 °C) 80 18 126/60 98 %      Temp Source Heart Rate Source Patient Position - Orthostatic VS BP Location FiO2 (%)   05/08/22 2333 -- 05/08/22 1424 05/08/22 1424 --   Oral  Lying Right arm       Pain Score       05/08/22 1419       6          Wt Readings from Last 1 Encounters:   05/08/22 74 4 kg (164 lb)     Additional Vital Signs:   05/09/22 05:40:25 98 3 °F (36 8 °C) -- -- 99/50 66 -- -- --   05/08/22 23:33:55 98 5 °F (36 9 °C) -- -- 98/53 68 -- None (Room air) Lying   05/08/22 16:22:33 98 7 °F (37 1 °C) 76 18 98/50 66 99 % -- --   05/08/22 1558 -- 77 16 99/56 -- 100 % None (Room air) Lying   05/08/22 1424 -- 82 -- 114/56 -- 99 % None (Room air) Lying       Pertinent Labs/Diagnostic Test Results: no imaging   No orders to display       Results from last 7 days   Lab Units 05/08/22  2140 05/08/22  1758 05/08/22  1439   WBC Thousand/uL 4 48 4 42 4 75   HEMOGLOBIN g/dL 10 1* 9 7* 10 7*   HEMATOCRIT % 30 6* 30 2* 33 5*   PLATELETS Thousands/uL 172 166 187   NEUTROS ABS Thousands/µL  --   --  3 43     Results from last 7 days   Lab Units 05/08/22  1439   SODIUM mmol/L 138   POTASSIUM mmol/L 3 5   CHLORIDE mmol/L 103   CO2 mmol/L 27   ANION GAP mmol/L 8   BUN mg/dL 8   CREATININE mg/dL 0 78   EGFR ml/min/1 73sq m 109   CALCIUM mg/dL 8 2*     Results from last 7 days   Lab Units 05/08/22  1439   AST U/L 8   ALT U/L 12   ALK PHOS U/L 66   TOTAL PROTEIN g/dL 7 5   ALBUMIN g/dL 3 7   TOTAL BILIRUBIN mg/dL 0 40     Results from last 7 days   Lab Units 05/08/22  1439   GLUCOSE RANDOM mg/dL 84     Results from last 7 days   Lab Units 05/08/22  1601   PROTIME seconds 14 6*   INR  1 15   PTT seconds 42*     Results from last 7 days   Lab Units 05/08/22  1449   CLARITY UA  Clear   COLOR UA  Yellow   SPEC GRAV UA  1 020   PH UA  6 0   GLUCOSE UA mg/dl Negative   KETONES UA mg/dl Negative BLOOD UA  Negative   PROTEIN UA mg/dl Negative   NITRITE UA  Negative   BILIRUBIN UA  Negative   UROBILINOGEN UA E U /dl 0 2   LEUKOCYTES UA  Negative       ED Treatment:   Medication Administration from 05/08/2022 1235 to 05/08/2022 1614       Date/Time Order Dose Route Action Comments     05/08/2022 1442 sodium chloride 0 9 % bolus 1,000 mL 1,000 mL Intravenous New Bag      05/08/2022 1449 ketorolac (TORADOL) injection 30 mg 30 mg Intravenous Given         Past Medical History:   Diagnosis Date    Anxiety     Asthma     Depression     Psychiatric disorder     Self-injurious behavior      Present on Admission:   Anemia due to blood loss      Admitting Diagnosis: Abdominal pain [R10 9]  Anemia [D64 9]  Ruptured ovarian cyst [N83 209]  Age/Sex: 24 y o  female  Admission Orders:  Scheduled Medications:  nicotine, 1 patch, Transdermal, Daily    citalopram (CeleXA) tablet 20 mg  Dose: 20 mg  Freq: Daily Route: PO  Start: 05/09/22 1100 End: 05/11/22 0859    Continuous IV Infusions:  lactated ringers, 125 mL/hr, Intravenous, Continuous - dc 5/9/22 0929  PRN Meds:  ketorolac, 30 mg, Intravenous, Q6H PRN - used x 1 5/8/22           Network Utilization Review Department  ATTENTION: Please call with any questions or concerns to [phone] and carefully listen to the prompts so that you are directed to the right person  All voicemails are confidential   Chrystine Can all requests for admission clinical reviews, approved or denied determinations and any other requests to dedicated fax number below belonging to the campus where the patient is receiving treatment   List of dedicated fax numbers for the Facilities:  [address] DENIALS (Administrative/Medical Necessity) [phone]   [address] (Maternity/NICU/Pediatrics) 261 [facility],[address]  [address] [place] [address] [id number] [place] [address] [address] [place] [id number] [phone]

## 2022-05-09 NOTE — DISCHARGE SUMMARY
Discharge Summary - Brooke Boston 24 y o  female MRN: [id number]    Unit/Bed#: -01 Encounter: [id number]    Admission Date:   Admission Orders (From admission, onward)     Ordered        05/08/22 1517  Place in Observation  Once                        Admitting Diagnosis: Abdominal pain [R10 9]  Anemia [D64 9]  Ruptured ovarian cyst [N83 209]    HPI: ruptured cyst diagnosed several days ago, pt came here as pain was not resolved    Procedures Performed: No orders of the defined types were placed in this encounter  Summary of Hospital Course: pt was stable throughout admission, vitals stable, pain unchanged,       Significant Findings, Care, Treatment and Services Provided: serial CBC, observation    Complications: none    Discharge Diagnosis: ruptured ovarian cyst, anemia due bleeding, bleeding resolved, hemoglobin stable    Medical Problems             Resolved Problems  Date Reviewed: 5/8/2022    None                Condition at Discharge: stable         Discharge instructions/Information to patient and family:   See after visit summary for information provided to patient and family  Provisions for Follow-Up Care:  See after visit summary for information related to follow-up care and any pertinent home health orders  PCP: Stephanie Baldwin MD    Disposition: See After Visit Summary for discharge disposition information  Planned Readmission: No      Discharge Statement   I spent 20 minutes discharging the patient  This time was spent on the day of discharge  I had direct contact with the patient on the day of discharge  Additional documentation is required if more than 30 minutes were spent on discharge  Discharge Medications:  See after visit summary for reconciled discharge medications provided to patient and family

## 2022-05-09 NOTE — PLAN OF CARE
Problem: PAIN - ADULT  Goal: Verbalizes/displays adequate comfort level or baseline comfort level  Description: Interventions:  - Encourage patient to monitor pain and request assistance  - Assess pain using appropriate pain scale  - Administer analgesics based on type and severity of pain and evaluate response  - Implement non-pharmacological measures as appropriate and evaluate response  - Consider cultural and social influences on pain and pain management  - Notify physician/advanced practitioner if interventions unsuccessful or patient reports new pain  Outcome: Progressing     Problem: INFECTION - ADULT  Goal: Absence or prevention of progression during hospitalization  Description: INTERVENTIONS:  - Assess and monitor for signs and symptoms of infection  - Monitor lab/diagnostic results  - Monitor all insertion sites, i e  indwelling lines, tubes, and drains  - Monitor endotracheal if appropriate and nasal secretions for changes in amount and color  - Vincent appropriate cooling/warming therapies per order  - Administer medications as ordered  - Instruct and encourage patient and family to use good hand hygiene technique  - Identify and instruct in appropriate isolation precautions for identified infection/condition  Outcome: Progressing     Problem: DISCHARGE PLANNING  Goal: Discharge to home or other facility with appropriate resources  Description: INTERVENTIONS:  - Identify barriers to discharge w/patient and caregiver  - Arrange for needed discharge resources and transportation as appropriate  - Identify discharge learning needs (meds, wound care, etc )  - Arrange for interpretive services to assist at discharge as needed  - Refer to Case Management Department for coordinating discharge planning if the patient needs post-hospital services based on physician/advanced practitioner order or complex needs related to functional status, cognitive ability, or social support system  Outcome: Progressing Problem: Knowledge Deficit  Goal: Patient/family/caregiver demonstrates understanding of disease process, treatment plan, medications, and discharge instructions  Description: Complete learning assessment and assess knowledge base    Interventions:  - Provide teaching at level of understanding  - Provide teaching via preferred learning methods  Outcome: Progressing

## 2022-05-09 NOTE — PROGRESS NOTES
Hgb 9 5  Was 9 7 last night at 1800 so stable      Vitals still stable, pt feeling unchanged    Exam still benign      Will discharge for followup with her gyn in the next few days

## 2022-05-09 NOTE — PROGRESS NOTES
Consult - OB/GYN   Brooke Boston 24 y o  female MRN: [id number]  Unit/Bed#: -01 Encounter: [id number]    24 y o  admitted last pm with ruptured ovarian cyst      Chief complaint:  Continued pain from ruptured ovarian cyst    HPI:  Pt was admitted last night stating that her pain has not resolved yet  She was seen 5/5 elsewhere for sudden onset pelvic pain with syncope  She had us which revealed fluid in cul de sac and around right ovary  Pt was discharged and returned to our ED as pain had not resolved  She was admitted to watch h/h, which initially decreased but believed due to IVF dilution  Pt has been stable clinically, pain not worsening  Ate breakfast this AM without difficulty and was hungry  Using phone on my arrival in room and appears very comfortable during exam and discussion  Mother present with daughter  Active Problems:  Patient Active Problem List   Diagnosis    Ruptured ovarian cyst    Anemia due to blood loss         PMH:  Past Medical History:   Diagnosis Date    Anxiety     Asthma     Depression     Psychiatric disorder     Self-injurious behavior        PSH:  History reviewed  No pertinent surgical history  Social Hx:  noncontibutory    Meds:  No current facility-administered medications on file prior to encounter  Current Outpatient Medications on File Prior to Encounter   Medication Sig Dispense Refill    citalopram (CeleXA) 20 mg tablet Take 20 mg by mouth daily      LORazepam (ATIVAN) 0 5 mg tablet Take 0 25 mg by mouth 2 (two) times a day (Patient not taking: Reported on 5/8/2022 )         Allergies:   Allergies   Allergen Reactions    Buspar [Buspirone]     Clonazepam     Norgestimate-Eth Estradiol Swelling     Labs   Hgb:  10 7 - 9 7-10 1 - 9 2    Physical Exam:  BP 99/50   Pulse 76   Temp 98 3 °F (36 8 °C)   Resp 18   Ht 5' 3" (1 6 m)   Wt 74 4 kg (164 lb)   LMP 04/09/2022   SpO2 99%   Breastfeeding No   BMI 29 05 kg/m²     Physical Exam    abd- soft, nontender, no guarding, no rebound, no distention  Pelvic deferred  Ext without edema  Heart RRR at 72 by me      Assessment:   24 y o    female with ruptured ovarian cyst with hemorrhage  Clinically appears stable by exam and discussion  However, hgb up and down, possibly due to IVF? ?? Vs ongoing bleeding  Exam not in keeping with ongoing bleeding at this time  Just finished eating breakfast and doing well    Plan:   1  Long discussion with pt and her mother about risks of OR, benefits of OR    What ruptured cyst is, how they stop bleeding, etc   Will recheck cbc after IVF stopped and see if stable  Continue checking vitals all of which have been acceptable  NPO again in case go to OR  Pt and mother in agreement with plan

## 2022-05-09 NOTE — PLAN OF CARE
Problem: INFECTION - ADULT  Goal: Absence or prevention of progression during hospitalization  Description: INTERVENTIONS:  - Assess and monitor for signs and symptoms of infection  - Monitor lab/diagnostic results  - Monitor all insertion sites, i e  indwelling lines, tubes, and drains  - Monitor endotracheal if appropriate and nasal secretions for changes in amount and color  - [place] appropriate cooling/warming therapies per order  - Administer medications as ordered  - Instruct and encourage patient and family to use good hand hygiene technique  - Identify and instruct in appropriate isolation precautions for identified infection/condition  Outcome: Progressing

## 2022-05-09 NOTE — DISCHARGE INSTRUCTIONS
[facility]   [phone]      Acute Posthemorrhagic Anemia   AMBULATORY CARE:   Acute posthemorrhagic anemia  is a condition that develops when you lose a large amount of blood quickly  Anemia is a low number of red blood cells or a low amount of hemoglobin in your red blood cells  Hemoglobin is a protein that helps red blood cells carry oxygen throughout your body  Common signs and symptoms of acute posthemorrhagic anemia:  You may have any of the following, depending on how much blood you lost:  · Feeling weak, tired, dizzy, or lightheaded    · A fast or irregular heartbeat    · Pale or cold, clammy skin    · Shortness of breath, or fast, shallow breaths    · Nausea or loss of appetite    · Urinating little or not at all    · Trouble concentrating, or confusion    · Headache or seizures    · Chest pain or sweating    Call 911 or have someone call 911 for any of the following:   · You lose consciousness or cannot be woken  · You have severe chest pain  Seek care immediately if:   · You have dark or bloody bowel movements  Contact your healthcare provider if:   · Your symptoms are worse, even after treatment  · You have questions or concerns about your condition or care  Treatment:  Your healthcare provider may have you stop any medicines that are causing bleeding  Treatment depends on the amount of blood you lost, the cause of the bleeding, and your symptoms:  · A blood transfusion  may be needed if your body cannot replace the blood you have lost     · Surgery  may be needed to stop the bleeding, or to fix an injury  · Fluids  may be given through an IV to help increase your blood pressure  · Iron supplements  may be given if your iron level is too low  · Extra oxygen  may be needed if your oxygen level is too low  Manage your symptoms:   · Rest as needed    Anemia may make you more tired than usual  Rest will help you heal and can help prevent more bleeding  · Eat healthy foods rich in iron together with foods rich in vitamin C  Nuts, meat, dark leafy green vegetables, and beans are high in iron and protein  Vitamin C helps your body absorb iron  Foods rich in vitamin C include oranges and other citrus fruits  Ask your healthcare provider for a list of other foods that are high in iron or vitamin C  Ask if you need to be on a special diet  · Drink liquids as directed  Ask your healthcare provider how much liquid to drink and which liquids are best for you  For most people, good liquids are water, juice, and milk  Follow up with your healthcare provider as directed:  Write down your questions so you remember to ask them during your visits  © Copyright [employer] 2022 Information is for End User's use only and may not be sold, redistributed or otherwise used for commercial purposes  All illustrations and images included in CareNotes® are the copyrighted property of A D A M , Inc  or [facility] Saeed Solorio   The above information is an  only  It is not intended as medical advice for individual conditions or treatments  Talk to your doctor, nurse or pharmacist before following any medical regimen to see if it is safe and effective for you  Ruptured Ovarian Cyst   WHAT YOU NEED TO KNOW:   A ruptured ovarian cyst is a cyst that breaks open  A cyst is a sac that grows on an ovary  This sac usually contains fluid, but may sometimes have blood or tissue in it  A large cyst that ruptures may lead to problems that need immediate care  It is important to follow up with your healthcare provider to make sure your cyst went away completely with treatment  DISCHARGE INSTRUCTIONS:   Call 911 for any of the following:   · You are too weak or dizzy to stand up  Seek care immediately if:   · You have severe pain in your pelvis or in your abdomen  · You have pain along with a fever, nausea, or vomiting       · You have signs of shock from blood loss, such as dizziness, cold or clammy skin, or fast breathing  Contact your healthcare provider if:   · You notice changes in your monthly periods, or you begin to have nausea or vomiting with your periods  · You have new or worsening symptoms  · Your pain does not get better with pain medicine  · You have pain during sex  · You have bleeding from your vagina that is not your period  · Your abdomen is swollen, or you have a full or heavy feeling in your lower abdomen  · You have trouble urinating  · You have questions or concerns about your condition or care  Medicines: You may need any of the following:  · NSAIDs , such as ibuprofen, help decrease swelling, pain, and fever  This medicine is available with or without a doctor's order  NSAIDs can cause stomach bleeding or kidney problems in certain people  If you take blood thinner medicine, always ask if NSAIDs are safe for you  Always read the medicine label and follow directions  Do not give these medicines to children under 10months of age without direction from your child's healthcare provider  · Antibiotics  may be given to prevent or fight an infection caused by bacteria  · Take your medicine as directed  Contact your healthcare provider if you think your medicine is not helping or if you have side effects  Tell him or her if you are allergic to any medicine  Keep a list of the medicines, vitamins, and herbs you take  Include the amounts, and when and why you take them  Bring the list or the pill bottles to follow-up visits  Carry your medicine list with you in case of an emergency  Manage or prevent a ruptured ovarian cyst:   · Apply heat where you have pain, as directed  Heat can help relieve mild pain  Use a heating pad (set on low) or hot water bottle  Wrap the pad or bottle in a towel before you apply it to your skin  Apply heat for 20 minutes every hour, or as directed   A warm bath may also help relieve the pain  · Ask when to come in for a follow-up examination  You may need another ultrasound 6 weeks after your cyst was treated  This will help make sure the cyst is no longer growing or causing health problems  You may also need ultrasound tests for 2 or 3 monthly periods to see how hormones affect your ovaries  · Ask about birth control pills  These may help reduce your risk for cysts  Ask your healthcare provider if birth control pills are right for you  The risk for a blood clot is higher if you take birth control pills, especially if you are older than 35 or smoke  · Have a pelvic exam every year  This may also be called a well woman visit  The exam will include a Pap smear to check for certain cancers  Your healthcare provider will also press on your abdomen to check for lumps or other problems  A pelvic exam can help find problems early  This makes treatment easier and more effective  Tell your healthcare provider if you notice any changes in your monthly periods  Examples include periods that start on a different day than usual, or are lighter or heavier than usual  Tell your provider if you have worse pain than usual, or if the pain is different than you had before  Follow up with your doctor as directed:  Write down your questions so you remember to ask them during your visits  © Copyright [employer] 2022 Information is for End User's use only and may not be sold, redistributed or otherwise used for commercial purposes  All illustrations and images included in CareNotes® are the copyrighted property of [employer]  or Evelyn Meza  The above information is an  only  It is not intended as medical advice for individual conditions or treatments  Talk to your doctor, nurse or pharmacist before following any medical regimen to see if it is safe and effective for you

## 2022-05-09 NOTE — CASE MANAGEMENT
Case Management Assessment & Discharge Planning Note    Patient name Claudell Hashimoto  Location /-67 MRN [id number]  : 2000 Date 2022       Current Admission Date: 2022  Current Admission Diagnosis:Ruptured ovarian cyst   Patient Active Problem List    Diagnosis Date Noted    Ruptured ovarian cyst 2022    Anemia due to blood loss 2022      LOS (days): 0  Geometric Mean LOS (GMLOS) (days):   Days to GMLOS:     OBJECTIVE:         Current admission status: Observation  Referral Reason: Financial Assistance Eligible Service Req,No Insurance    Preferred Pharmacy:   [address]  [address]  [address] [facility] [address]  Phone: [phone] Fax: [phone]    Primary Care Provider: Antonio Capone MD    Primary Insurance:   Secondary Insurance:     ASSESSMENT:  Marquita Castañeda Proxies    There are no active Health Care Proxies on file  Advance Directives  Does patient have a [address] [place] [address] Avenue?: No  Was patient offered paperwork?: Yes (Declines)  Does patient have Advance Directives?: No  Was patient offered paperwork?: Yes (Declines)    Readmission Root Cause  30 Day Readmission: No    Patient Information  Admitted from[de-identified] Home  Mental Status: Alert  During Assessment patient was accompanied by: Not accompanied during assessment  Assessment information provided by[de-identified] Patient  Primary Caregiver: Self  Support Systems: Family members  [place] of Residence: [address] [place] [address] do you live in?: [facility] entry access options   Select all that apply : Stairs  Number of steps to enter home : 5  Do the steps have railings?: Yes  Type of Current Residence: Apartment  Floor Level: 10  Upon entering residence, is there a bedroom on the main floor (no further steps)?: Yes  Upon entering residence, is there a bathroom on the main floor (no further steps)?: Yes  In the last 12 months, was there a time when you were not able to pay the mortgage or rent on time?: No  In the last 12 months, how many places have you lived?: 1  In the last 12 months, was there a time when you did not have a steady place to sleep or slept in a shelter (including now)?: No  Homeless/housing insecurity resource given?: N/A  Living Arrangements: Lives w/ Parent(s)  Is patient a ?: No    Activities of Daily Living Prior to Admission  Functional Status: Independent  Completes ADLs independently?: Yes  Ambulates independently?: Yes  Does patient use assisted devices?: No  Does patient currently own DME?: No  Does patient have a history of Outpatient Therapy (PT/OT)?: No  Does the patient have a history of Short-Term Rehab?: No  Does patient have a history of HHC?: No  Does patient currently have [facility] ?: No    Patient Information Continued  Income Source: Employed  Does patient have prescription coverage?: No  Within the past 12 months, you worried that your food would run out before you got the money to buy more : Never true  Within the past 12 months, the food you bought just didnt last and you didnt have money to get more : Never true  Food insecurity resource given?: N/A  Does patient receive dialysis treatments?: No  Does patient have a history of substance abuse?: No  Does patient have a history of Mental Health Diagnosis?: No    Means of Transportation  Means of Transport to Appts[de-identified] Drives Self  In the past 12 months, has lack of transportation kept you from medical appointments or from getting medications?: No  In the past 12 months, has lack of transportation kept you from meetings, work, or from getting things needed for daily living?: No  Was application for public transport provided?: N/A    DISCHARGE DETAILS:    Discharge planning discussed with[de-identified] Patient  Freedom of Choice: Yes     CM contacted family/caregiver?: No- see comments (Declines)     Contacts  Patient Contacts: Heather Humphrey (Mother) [phone] (Mobile)  Relationship to Patient[de-identified] Family  Contact Method: Phone  Phone Number: Oni Soto (Mother) [phone] (Mobile)  Reason/Outcome: Emergency Contact    DME Referral Provided  Referral made for DME?: No    Other Referral/Resources/Interventions Provided:  Financial Resources Provided: Financial Counselor  Interventions: Other (Specify)  Referral Comments: Referral to PATHS    Treatment Team Recommendation: Home     Additional Comments: Met with patient at bedside  Patient reports she has no insurance    Email sent to [facility] to request PATHS referral

## 2022-08-19 ENCOUNTER — OFFICE VISIT (OUTPATIENT)
Dept: OBGYN CLINIC | Facility: CLINIC | Age: 22
End: 2022-08-19

## 2022-08-19 VITALS
SYSTOLIC BLOOD PRESSURE: 100 MMHG | WEIGHT: 159.6 LBS | BODY MASS INDEX: 28.28 KG/M2 | DIASTOLIC BLOOD PRESSURE: 75 MMHG | HEIGHT: 63 IN

## 2022-08-19 DIAGNOSIS — Z01.419 ROUTINE GYNECOLOGICAL EXAMINATION: Primary | ICD-10-CM

## 2022-08-19 PROCEDURE — 99385 PREV VISIT NEW AGE 18-39: CPT | Performed by: PHYSICIAN ASSISTANT

## 2022-08-19 RX ORDER — NITROFURANTOIN 25; 75 MG/1; MG/1
100 CAPSULE ORAL 2 TIMES DAILY
COMMUNITY
Start: 2022-08-15 | End: 2022-08-22

## 2022-08-19 RX ORDER — ALBUTEROL SULFATE 90 UG/1
2 AEROSOL, METERED RESPIRATORY (INHALATION) EVERY 6 HOURS PRN
COMMUNITY

## 2022-08-19 RX ORDER — HYDROXYZINE HYDROCHLORIDE 10 MG/1
10 TABLET, FILM COATED ORAL
COMMUNITY
Start: 2022-07-14

## 2022-08-19 NOTE — ASSESSMENT & PLAN NOTE
Pap guidelines reviewed  Pap with reflex done today  Reviewed ovarian cyst rupture that required hospitalization in 5/2022  Reviewed recommendation to consider hormonal birth control to stop ovulation and cyst formation  Patient does not want to be on any hormones, "I do not want to put anything unnatural in my body"  Aware of risk of another cyst formation and rupture  Reviewed s/s to look out for and when to go to ER  Reviewed bloating over the last couple of years  Recommend follow up with PCP or GI to evaluate for other causes  Patient is currently self pay, trying to get insurance  Offered pelvic ultrasound declines a this time due to cost   Reassuring that bloating has been the same in the last few years and had recent imaging in 5/2022 showing ovarian cyst rupture but otherwise normal  Patient to call if develops worsening pain or bloating for pelvic ultrasound script  Reviewed recurrent UTIs  If has 3 or more UTI's in a year is recommended to follow up with urology for further work up  Reviewed common bladder irritants, encouraged smoking cessation  Consider OTC cranberry supplements, encouraged hydration  Return to office for annual or as needed

## 2022-08-19 NOTE — PROGRESS NOTES
Assessment/Plan   Problem List Items Addressed This Visit        Other    Routine gynecological examination - Primary     Pap guidelines reviewed  Pap with reflex done today  Reviewed ovarian cyst rupture that required hospitalization in 5/2022  Reviewed recommendation to consider hormonal birth control to stop ovulation and cyst formation  Patient does not want to be on any hormones, "I do not want to put anything unnatural in my body"  Aware of risk of another cyst formation and rupture  Reviewed s/s to look out for and when to go to ER  Reviewed bloating over the last couple of years  Recommend follow up with PCP or GI to evaluate for other causes  Patient is currently self pay, trying to get insurance  Offered pelvic ultrasound declines a this time due to cost   Reassuring that bloating has been the same in the last few years and had recent imaging in 5/2022 showing ovarian cyst rupture but otherwise normal  Patient to call if develops worsening pain or bloating for pelvic ultrasound script  Reviewed recurrent UTIs  If has 3 or more UTI's in a year is recommended to follow up with urology for further work up  Reviewed common bladder irritants, encouraged smoking cessation  Consider OTC cranberry supplements, encouraged hydration  Return to office for annual or as needed  Relevant Orders    Thinprep Pap Reflex HPV mRNA E6/E7          Subjective:     Patient ID: Althea Marie is a 25 y o  y o  female  HPI  26 yo seen for annual exam    Reports menses mostly regular  In February and April menses were 17-19 days apart  All others were 28-30  Menses last about 5 days  Uses tampons first day changing every 1-2 hrs and then will lighten up on the second day  Sometimes won't get any cramps other times will be very painful  Was seen in hospital in 5/2022 for ovarian cyst rupture resulting in anemia due to bleeding  That pain has since resolved     Since May has had 2 UTIs, currently being treated with antibiotics  Has never had UTI's in the past  Denies bowel or bladder issues, STD concerns, not currently sexually active  Denies vaginal discharge, odor, itching, fever, or chills  Reports bloating for the past couple of years  Not necessary relieved with bowel movement  Patient has not tolerated birth control in the past, reports had GI upset on a pill she took in the past and skin swelling from the birth control patch  Last pap: Has not had one done before  The following portions of the patient's history were reviewed and updated as appropriate:   She  has a past medical history of Anxiety, Asthma, Depression, Psychiatric disorder, and Self-injurious behavior  She   Patient Active Problem List    Diagnosis Date Noted    Routine gynecological examination 08/19/2022    Ruptured ovarian cyst 05/08/2022    Anemia due to blood loss 05/08/2022    Abdominal discomfort, generalized 05/02/2016    Nausea 05/02/2016    ADHD (attention deficit hyperactivity disorder), inattentive type 09/15/2015    Anxiety 09/15/2015    Depression 09/15/2015    Essential tremor 09/15/2015    Staring spell 09/15/2015     She  has no past surgical history on file  Her family history includes Cancer in her maternal grandfather and maternal grandmother; Diabetes in her maternal grandmother and mother; Heart disease in her cousin  She  reports that she has been smoking cigarettes and e-cigarettes  She has been smoking about 0 25 packs per day  She has never used smokeless tobacco  She reports current alcohol use of about 5 0 standard drinks of alcohol per week  She reports that she does not use drugs    Current Outpatient Medications   Medication Sig Dispense Refill    albuterol (PROVENTIL HFA,VENTOLIN HFA) 90 mcg/act inhaler Inhale 2 puffs every 6 (six) hours as needed      hydrOXYzine HCL (ATARAX) 10 mg tablet Take 10 mg by mouth      nitrofurantoin (MACROBID) 100 mg capsule Take 100 mg by mouth 2 (two) times a day  ibuprofen (MOTRIN) 200 mg tablet Take 3 tablets (600 mg total) by mouth every 6 (six) hours as needed for moderate pain for up to 5 days 20 tablet 0     No current facility-administered medications for this visit  She is allergic to buspar [buspirone], clonazepam, and norgestimate-eth estradiol       Menstrual History:  OB History        0    Para   0    Term   0       0    AB   0    Living   0       SAB   0    IAB   0    Ectopic   0    Multiple   0    Live Births   0                  Patient's last menstrual period was 2022 (exact date)  Review of Systems   Constitutional: Negative for fatigue, fever and unexpected weight change  HENT: Negative for dental problem and sinus pressure  Eyes: Negative for visual disturbance  Respiratory: Negative for cough, shortness of breath and wheezing  Cardiovascular: Negative for chest pain  Gastrointestinal: Positive for abdominal distention  Negative for abdominal pain, blood in stool, constipation, diarrhea, nausea and vomiting  Endocrine: Negative for polydipsia  Genitourinary: Negative for difficulty urinating, dyspareunia, dysuria, frequency, hematuria, pelvic pain and urgency  Musculoskeletal: Negative for arthralgias and back pain  Neurological: Negative for dizziness, seizures, light-headedness and headaches  Psychiatric/Behavioral: Negative for suicidal ideas  The patient is not nervous/anxious  Objective:  Vitals:    22 1315   BP: 100/75   BP Location: Left arm   Patient Position: Sitting   Cuff Size: Standard   Weight: 72 4 kg (159 lb 9 6 oz)   Height: 5' 2 5" (1 588 m)      Physical Exam  Constitutional:       Appearance: Normal appearance  She is well-developed  Genitourinary:      Vulva and bladder normal       No lesions in the vagina  Right Labia: No rash, tenderness, lesions or skin changes  Left Labia: No tenderness, lesions, skin changes or rash  No labial fusion noted  No inguinal adenopathy present in the right or left side  No vaginal discharge, erythema, tenderness or bleeding  Right Adnexa: not tender, not full and no mass present  Left Adnexa: not tender, not full and no mass present  No cervical motion tenderness, discharge or lesion  Uterus is not enlarged, tender or irregular  No uterine mass detected  No urethral prolapse, tenderness or mass present  Bladder is not tender  Breasts: Breasts are symmetrical       Right: No swelling, bleeding, inverted nipple, mass, nipple discharge, skin change, tenderness, axillary adenopathy or supraclavicular adenopathy  Left: No swelling, bleeding, inverted nipple, mass, nipple discharge, skin change, tenderness, axillary adenopathy or supraclavicular adenopathy  HENT:      Head: Normocephalic and atraumatic  Neck:      Thyroid: No thyromegaly  Cardiovascular:      Rate and Rhythm: Normal rate and regular rhythm  Heart sounds: Normal heart sounds  No murmur heard  No friction rub  No gallop  Pulmonary:      Effort: Pulmonary effort is normal  No respiratory distress  Breath sounds: Normal breath sounds  No wheezing  Abdominal:      General: There is no distension  Palpations: Abdomen is soft  There is no mass  Tenderness: There is no abdominal tenderness  There is no guarding or rebound  Hernia: No hernia is present  Lymphadenopathy:      Cervical: No cervical adenopathy  Upper Body:      Right upper body: No supraclavicular, axillary or pectoral adenopathy  Left upper body: No supraclavicular, axillary or pectoral adenopathy  Lower Body: No right inguinal adenopathy  No left inguinal adenopathy  Neurological:      Mental Status: She is alert and oriented to person, place, and time  Skin:     General: Skin is warm and dry     Psychiatric:         Behavior: Behavior normal

## 2022-08-25 LAB
CLINICAL INFO: ABNORMAL
CYTO CVX: ABNORMAL
DATE PREVIOUS BX: ABNORMAL
GEN CATEG CVX/VAG CYTO-IMP: ABNORMAL
LMP START DATE: ABNORMAL
SL AMB PREV. PAP:: ABNORMAL
SPECIMEN SOURCE CVX/VAG CYTO: ABNORMAL

## 2022-08-30 ENCOUNTER — TELEPHONE (OUTPATIENT)
Dept: OBGYN CLINIC | Facility: CLINIC | Age: 22
End: 2022-08-30

## 2022-10-18 PROBLEM — Z01.419 ROUTINE GYNECOLOGICAL EXAMINATION: Status: RESOLVED | Noted: 2022-08-19 | Resolved: 2022-10-18

## 2022-12-06 ENCOUNTER — OFFICE VISIT (OUTPATIENT)
Dept: OBGYN CLINIC | Facility: CLINIC | Age: 22
End: 2022-12-06

## 2022-12-06 VITALS
BODY MASS INDEX: 29.44 KG/M2 | DIASTOLIC BLOOD PRESSURE: 62 MMHG | SYSTOLIC BLOOD PRESSURE: 102 MMHG | WEIGHT: 160 LBS | HEIGHT: 62 IN

## 2022-12-06 DIAGNOSIS — N89.8 VAGINAL DISCHARGE: Primary | ICD-10-CM

## 2022-12-06 DIAGNOSIS — B96.89 BV (BACTERIAL VAGINOSIS): ICD-10-CM

## 2022-12-06 DIAGNOSIS — Z20.2 POSSIBLE EXPOSURE TO STD: ICD-10-CM

## 2022-12-06 DIAGNOSIS — N76.0 BV (BACTERIAL VAGINOSIS): ICD-10-CM

## 2022-12-06 LAB
BV WHIFF TEST VAG QL: ABNORMAL
CLUE CELLS SPEC QL WET PREP: ABNORMAL
PH SMN: 5.5 [PH]
SL AMB POCT WET MOUNT: ABNORMAL
T VAGINALIS VAG QL WET PREP: ABNORMAL
YEAST VAG QL WET PREP: ABNORMAL

## 2022-12-06 RX ORDER — HYDROXYZINE PAMOATE 25 MG/1
CAPSULE ORAL
COMMUNITY
Start: 2022-09-28

## 2022-12-06 RX ORDER — METRONIDAZOLE 7.5 MG/G
1 GEL VAGINAL
Qty: 70 G | Refills: 1 | Status: SHIPPED | OUTPATIENT
Start: 2022-12-06 | End: 2022-12-11

## 2022-12-06 RX ORDER — BUSPIRONE HYDROCHLORIDE 15 MG/1
15 TABLET ORAL EVERY 12 HOURS
COMMUNITY
Start: 2022-10-26

## 2022-12-06 NOTE — PATIENT INSTRUCTIONS
Bacterial vaginosis noted on wet mount  Rx sent to requested pharmacy  No sex during treatment  Complete all medication as directed  Open to air at bedtime  Cotton underwear  Wear looser fitting clothing  Get out of exercise clothes and bathing suits ASAP  Avoid bathroom wipes, feminine washes/scented soaps  Wash with cetaphil cleanser  Watch sugar and carb intake  Refrain from sexual activity while on treatment

## 2022-12-06 NOTE — PROGRESS NOTES
Assessment/Plan:  Bacterial vaginosis noted on wet mount  Rx sent to requested pharmacy  No sex during treatment  Complete all medication as directed  Open to air at bedtime  Cotton underwear  Wear looser fitting clothing  Get out of exercise clothes and bathing suits ASAP  Avoid bathroom wipes, feminine washes/scented soaps  Wash with cetaphil cleanser  Watch sugar and carb intake  Refrain from sexual activity while on treatment  Diagnoses and all orders for this visit:    Vaginal discharge  -     POCT wet mount    Possible exposure to STD  -     Chlamydia/N  gonorrhoeae RNA, TMA    BV (bacterial vaginosis)  -     metroNIDAZOLE (METROGEL) 0 75 % vaginal gel; Insert 1 application into the vagina daily at bedtime for 5 days    Other orders  -     busPIRone (BUSPAR) 15 mg tablet; Take 15 mg by mouth every 12 (twelve) hours  -     hydrOXYzine pamoate (VISTARIL) 25 mg capsule; TAKE 1 CAPSULE BY MOUTH 3 TIMES A DAY AS NEEDED (ANXIETY)  Subjective:      Patient ID: Lizz Enriquez is a 25 y o  female  Here for eval vaginal discharge and odor Has tried Monistat with no improvement Last seen 8/2022 for WA PAP LGSIL       The following portions of the patient's history were reviewed and updated as appropriate: allergies, current medications, past family history, past medical history, past social history, past surgical history and problem list     Review of Systems   Constitutional: Negative for chills and fever  Gastrointestinal: Negative for abdominal pain  Genitourinary: Positive for vaginal discharge  Negative for dyspareunia, dysuria, genital sores, pelvic pain and vaginal pain  Vaginal odor         Objective:      /62 (BP Location: Right arm, Patient Position: Sitting, Cuff Size: Standard)   Ht 5' 2" (1 575 m)   Wt 72 6 kg (160 lb)   LMP 11/28/2022   BMI 29 26 kg/m²          Physical Exam  Vitals and nursing note reviewed     Constitutional:       General: She is not in acute distress  Appearance: Normal appearance  HENT:      Head: Normocephalic and atraumatic  Abdominal:      General: There is no distension  Palpations: There is no mass  Tenderness: There is no abdominal tenderness  Genitourinary:     Exam position: Lithotomy position  Labia:         Right: No rash or lesion  Left: No rash or lesion  Vagina: Vaginal discharge present  No erythema  Cervix: No cervical motion tenderness, discharge, lesion or cervical bleeding  Uterus: Normal     Lymphadenopathy:      Lower Body: No right inguinal adenopathy  No left inguinal adenopathy  Skin:     General: Skin is warm and dry  Neurological:      General: No focal deficit present  Mental Status: She is alert and oriented to person, place, and time  Psychiatric:         Mood and Affect: Mood normal          Behavior: Behavior normal          Thought Content:  Thought content normal

## 2022-12-08 LAB
C TRACH RRNA SPEC QL NAA+PROBE: NOT DETECTED
N GONORRHOEA RRNA SPEC QL NAA+PROBE: NOT DETECTED

## 2023-07-27 ENCOUNTER — HOSPITAL ENCOUNTER (OUTPATIENT)
Dept: ULTRASOUND IMAGING | Facility: CLINIC | Age: 23
Discharge: HOME | End: 2023-07-27

## 2023-07-27 DIAGNOSIS — N83.209 OVARIAN CYST: ICD-10-CM

## 2023-07-27 PROCEDURE — 76830 TRANSVAGINAL US NON-OB: CPT

## 2023-07-27 PROCEDURE — 76856 US EXAM PELVIC COMPLETE: CPT
